# Patient Record
Sex: FEMALE | Race: WHITE | NOT HISPANIC OR LATINO | Employment: UNEMPLOYED | URBAN - METROPOLITAN AREA
[De-identification: names, ages, dates, MRNs, and addresses within clinical notes are randomized per-mention and may not be internally consistent; named-entity substitution may affect disease eponyms.]

---

## 2017-05-02 ENCOUNTER — APPOINTMENT (OUTPATIENT)
Dept: MAMMOGRAPHY | Facility: HOSPITAL | Age: 52
End: 2017-05-02

## 2017-06-05 ENCOUNTER — TRANSCRIBE ORDERS (OUTPATIENT)
Dept: ADMINISTRATIVE | Facility: HOSPITAL | Age: 52
End: 2017-06-05

## 2017-06-05 DIAGNOSIS — R92.8 ABNORMAL MAMMOGRAM: Primary | ICD-10-CM

## 2017-07-06 ENCOUNTER — HOSPITAL ENCOUNTER (OUTPATIENT)
Dept: MAMMOGRAPHY | Facility: HOSPITAL | Age: 52
Discharge: HOME | End: 2017-07-06
Admitting: NURSE PRACTITIONER

## 2017-07-06 ENCOUNTER — HOSPITAL ENCOUNTER (OUTPATIENT)
Dept: ULTRASOUND IMAGING | Facility: HOSPITAL | Age: 52
Discharge: HOME | End: 2017-07-06

## 2017-07-06 DIAGNOSIS — R92.8 ABNORMAL MAMMOGRAM: ICD-10-CM

## 2017-07-06 PROCEDURE — G0204 DX MAMMO INCL CAD BI: HCPCS

## 2017-07-06 PROCEDURE — G0279 TOMOSYNTHESIS, MAMMO: HCPCS

## 2017-07-06 PROCEDURE — 77066 DX MAMMO INCL CAD BI: CPT | Performed by: RADIOLOGY

## 2017-07-06 PROCEDURE — 76642 ULTRASOUND BREAST LIMITED: CPT

## 2017-07-06 PROCEDURE — 77062 BREAST TOMOSYNTHESIS BI: CPT | Performed by: RADIOLOGY

## 2017-07-06 PROCEDURE — 76642 ULTRASOUND BREAST LIMITED: CPT | Performed by: RADIOLOGY

## 2018-03-22 ENCOUNTER — TRANSCRIBE ORDERS (OUTPATIENT)
Dept: ADMINISTRATIVE | Facility: HOSPITAL | Age: 53
End: 2018-03-22

## 2018-03-22 DIAGNOSIS — R05.9 COUGH: Primary | ICD-10-CM

## 2018-03-30 ENCOUNTER — HOSPITAL ENCOUNTER (OUTPATIENT)
Dept: CT IMAGING | Facility: HOSPITAL | Age: 53
Discharge: HOME | End: 2018-03-30
Admitting: NURSE PRACTITIONER

## 2018-03-30 DIAGNOSIS — R05.9 COUGH: ICD-10-CM

## 2018-03-30 PROCEDURE — 71260 CT THORAX DX C+: CPT

## 2018-03-30 PROCEDURE — 82565 ASSAY OF CREATININE: CPT

## 2018-03-30 PROCEDURE — 25010000002 IOPAMIDOL 61 % SOLUTION: Performed by: NURSE PRACTITIONER

## 2018-03-30 RX ADMIN — IOPAMIDOL 50 ML: 612 INJECTION, SOLUTION INTRAVENOUS at 13:50

## 2018-04-02 LAB — CREAT BLDA-MCNC: 1 MG/DL (ref 0.6–1.3)

## 2018-04-05 ENCOUNTER — TRANSCRIBE ORDERS (OUTPATIENT)
Dept: ADMINISTRATIVE | Facility: HOSPITAL | Age: 53
End: 2018-04-05

## 2018-04-05 DIAGNOSIS — E04.1 THYROID NODULE: Primary | ICD-10-CM

## 2018-04-12 ENCOUNTER — HOSPITAL ENCOUNTER (OUTPATIENT)
Dept: ULTRASOUND IMAGING | Facility: HOSPITAL | Age: 53
Discharge: HOME | End: 2018-04-12
Admitting: NURSE PRACTITIONER

## 2018-04-12 DIAGNOSIS — E04.1 THYROID NODULE: ICD-10-CM

## 2018-04-12 PROCEDURE — 76536 US EXAM OF HEAD AND NECK: CPT

## 2018-04-19 ENCOUNTER — TRANSCRIBE ORDERS (OUTPATIENT)
Dept: ADMINISTRATIVE | Facility: HOSPITAL | Age: 53
End: 2018-04-19

## 2018-04-19 DIAGNOSIS — E04.1 THYROID NODULE: Primary | ICD-10-CM

## 2018-04-26 ENCOUNTER — HOSPITAL ENCOUNTER (OUTPATIENT)
Dept: ULTRASOUND IMAGING | Facility: HOSPITAL | Age: 53
Discharge: HOME | End: 2018-04-26
Attending: OTOLARYNGOLOGY | Admitting: OTOLARYNGOLOGY

## 2018-04-26 DIAGNOSIS — E04.1 THYROID NODULE: ICD-10-CM

## 2018-04-26 PROCEDURE — 88173 CYTOPATH EVAL FNA REPORT: CPT | Performed by: RADIOLOGY

## 2018-04-26 PROCEDURE — 76942 ECHO GUIDE FOR BIOPSY: CPT

## 2018-04-26 PROCEDURE — 88305 TISSUE EXAM BY PATHOLOGIST: CPT | Performed by: RADIOLOGY

## 2018-04-26 RX ORDER — LIDOCAINE HYDROCHLORIDE 10 MG/ML
20 INJECTION, SOLUTION EPIDURAL; INFILTRATION; INTRACAUDAL; PERINEURAL ONCE
Status: COMPLETED | OUTPATIENT
Start: 2018-04-26 | End: 2018-04-26

## 2018-04-26 RX ADMIN — LIDOCAINE HYDROCHLORIDE 8 ML: 10 INJECTION, SOLUTION EPIDURAL; INFILTRATION; INTRACAUDAL; PERINEURAL at 14:22

## 2018-04-30 LAB
LAB AP CASE REPORT: NORMAL
Lab: NORMAL
PATH REPORT.FINAL DX SPEC: NORMAL

## 2018-06-15 ENCOUNTER — TRANSCRIBE ORDERS (OUTPATIENT)
Dept: ADMINISTRATIVE | Facility: HOSPITAL | Age: 53
End: 2018-06-15

## 2018-06-15 DIAGNOSIS — R92.8 ABNORMAL MAMMOGRAM: Primary | ICD-10-CM

## 2018-10-15 ENCOUNTER — TRANSCRIBE ORDERS (OUTPATIENT)
Dept: ADMINISTRATIVE | Facility: HOSPITAL | Age: 53
End: 2018-10-15

## 2018-10-15 DIAGNOSIS — E04.1 THYROID NODULE: Primary | ICD-10-CM

## 2018-11-13 ENCOUNTER — APPOINTMENT (OUTPATIENT)
Dept: ULTRASOUND IMAGING | Facility: HOSPITAL | Age: 53
End: 2018-11-13
Attending: OTOLARYNGOLOGY

## 2018-11-20 ENCOUNTER — HOSPITAL ENCOUNTER (OUTPATIENT)
Dept: ULTRASOUND IMAGING | Facility: HOSPITAL | Age: 53
Discharge: HOME | End: 2018-11-20
Attending: OTOLARYNGOLOGY | Admitting: OTOLARYNGOLOGY

## 2018-11-20 DIAGNOSIS — E04.1 THYROID NODULE: ICD-10-CM

## 2018-11-20 PROCEDURE — 76536 US EXAM OF HEAD AND NECK: CPT

## 2019-05-10 ENCOUNTER — TRANSCRIBE ORDERS (OUTPATIENT)
Dept: ADMINISTRATIVE | Facility: HOSPITAL | Age: 54
End: 2019-05-10

## 2019-05-10 DIAGNOSIS — E04.1 THYROID NODULE: Primary | ICD-10-CM

## 2019-05-23 ENCOUNTER — HOSPITAL ENCOUNTER (OUTPATIENT)
Dept: ULTRASOUND IMAGING | Facility: HOSPITAL | Age: 54
Discharge: HOME | End: 2019-05-23
Admitting: OTOLARYNGOLOGY

## 2019-05-23 DIAGNOSIS — E04.1 THYROID NODULE: ICD-10-CM

## 2019-05-23 PROCEDURE — 76536 US EXAM OF HEAD AND NECK: CPT

## 2020-02-20 ENCOUNTER — TRANSCRIBE ORDERS (OUTPATIENT)
Dept: ADMINISTRATIVE | Facility: HOSPITAL | Age: 55
End: 2020-02-20

## 2020-02-20 DIAGNOSIS — C73 PAPILLARY CARCINOMA OF THYROID (HCC): Primary | ICD-10-CM

## 2020-03-19 ENCOUNTER — TRANSCRIBE ORDERS (OUTPATIENT)
Dept: ADMINISTRATIVE | Facility: HOSPITAL | Age: 55
End: 2020-03-19

## 2020-03-19 DIAGNOSIS — R91.1 LUNG NODULE: Primary | ICD-10-CM

## 2020-05-05 ENCOUNTER — HOSPITAL ENCOUNTER (OUTPATIENT)
Dept: ULTRASOUND IMAGING | Facility: HOSPITAL | Age: 55
Discharge: HOME | End: 2020-05-05
Admitting: OTOLARYNGOLOGY

## 2020-05-05 ENCOUNTER — HOSPITAL ENCOUNTER (OUTPATIENT)
Dept: CT IMAGING | Facility: HOSPITAL | Age: 55
Discharge: HOME | End: 2020-05-05

## 2020-05-05 DIAGNOSIS — C73 PAPILLARY CARCINOMA OF THYROID (HCC): ICD-10-CM

## 2020-05-05 DIAGNOSIS — R91.1 LUNG NODULE: ICD-10-CM

## 2020-05-05 LAB — CREAT BLDA-MCNC: 0.8 MG/DL (ref 0.6–1.3)

## 2020-05-05 PROCEDURE — 71260 CT THORAX DX C+: CPT

## 2020-05-05 PROCEDURE — 76536 US EXAM OF HEAD AND NECK: CPT

## 2020-05-05 PROCEDURE — 25010000002 IOPAMIDOL 61 % SOLUTION: Performed by: OTOLARYNGOLOGY

## 2020-05-05 PROCEDURE — 82565 ASSAY OF CREATININE: CPT

## 2020-05-05 RX ADMIN — IOPAMIDOL 75 ML: 612 INJECTION, SOLUTION INTRAVENOUS at 14:25

## 2021-03-26 ENCOUNTER — TRANSCRIBE ORDERS (OUTPATIENT)
Dept: ADMINISTRATIVE | Facility: HOSPITAL | Age: 56
End: 2021-03-26

## 2021-03-26 DIAGNOSIS — R91.1 LUNG NODULE: Primary | ICD-10-CM

## 2021-03-26 DIAGNOSIS — E04.1 THYROID NODULE: ICD-10-CM

## 2022-06-09 ENCOUNTER — TELEPHONE (OUTPATIENT)
Dept: FAMILY MEDICINE CLINIC | Facility: CLINIC | Age: 57
End: 2022-06-09

## 2023-04-18 ENCOUNTER — TELEPHONE (OUTPATIENT)
Dept: FAMILY MEDICINE CLINIC | Facility: CLINIC | Age: 58
End: 2023-04-18

## 2023-04-18 ENCOUNTER — OFFICE VISIT (OUTPATIENT)
Dept: FAMILY MEDICINE CLINIC | Facility: CLINIC | Age: 58
End: 2023-04-18
Payer: COMMERCIAL

## 2023-04-18 VITALS
BODY MASS INDEX: 20.25 KG/M2 | OXYGEN SATURATION: 99 % | SYSTOLIC BLOOD PRESSURE: 116 MMHG | DIASTOLIC BLOOD PRESSURE: 68 MMHG | WEIGHT: 121.56 LBS | HEART RATE: 62 BPM | HEIGHT: 65 IN

## 2023-04-18 DIAGNOSIS — Z12.4 SCREENING FOR CERVICAL CANCER: ICD-10-CM

## 2023-04-18 DIAGNOSIS — K58.2 IRRITABLE BOWEL SYNDROME WITH BOTH CONSTIPATION AND DIARRHEA: ICD-10-CM

## 2023-04-18 DIAGNOSIS — Z00.00 ANNUAL PHYSICAL EXAM: Primary | ICD-10-CM

## 2023-04-18 DIAGNOSIS — E03.9 ACQUIRED HYPOTHYROIDISM: ICD-10-CM

## 2023-04-18 DIAGNOSIS — Z12.31 ENCOUNTER FOR SCREENING MAMMOGRAM FOR MALIGNANT NEOPLASM OF BREAST: ICD-10-CM

## 2023-04-18 DIAGNOSIS — E56.9 VITAMIN DEFICIENCY: ICD-10-CM

## 2023-04-18 DIAGNOSIS — Z12.11 SCREENING FOR COLON CANCER: ICD-10-CM

## 2023-04-18 DIAGNOSIS — J45.20 MILD INTERMITTENT ASTHMA WITHOUT COMPLICATION: ICD-10-CM

## 2023-04-18 DIAGNOSIS — R22.32 ARM MASS, LEFT: ICD-10-CM

## 2023-04-18 DIAGNOSIS — Z13.220 SCREENING CHOLESTEROL LEVEL: ICD-10-CM

## 2023-04-18 DIAGNOSIS — F41.9 ANXIETY: ICD-10-CM

## 2023-04-18 PROBLEM — K58.9 IBS (IRRITABLE BOWEL SYNDROME): Status: ACTIVE | Noted: 2023-04-18

## 2023-04-18 LAB
BILIRUB BLD-MCNC: NEGATIVE MG/DL
CLARITY, POC: CLEAR
COLOR UR: YELLOW
EXPIRATION DATE: NORMAL
GLUCOSE UR STRIP-MCNC: NEGATIVE MG/DL
KETONES UR QL: NEGATIVE
LEUKOCYTE EST, POC: NEGATIVE
Lab: NORMAL
NITRITE UR-MCNC: NEGATIVE MG/ML
PH UR: 5.5 [PH] (ref 5–8)
PROT UR STRIP-MCNC: NEGATIVE MG/DL
RBC # UR STRIP: NEGATIVE /UL
SP GR UR: 1.03 (ref 1–1.03)
UROBILINOGEN UR QL: NORMAL

## 2023-04-18 RX ORDER — ALBUTEROL SULFATE 90 UG/1
AEROSOL, METERED RESPIRATORY (INHALATION) EVERY 4 HOURS
COMMUNITY
Start: 2017-12-19

## 2023-04-18 RX ORDER — CYANOCOBALAMIN 1000 UG/ML
INJECTION, SOLUTION INTRAMUSCULAR; SUBCUTANEOUS
COMMUNITY
Start: 2023-03-09

## 2023-04-18 RX ORDER — DIPHENOXYLATE HYDROCHLORIDE AND ATROPINE SULFATE 2.5; .025 MG/1; MG/1
TABLET ORAL
COMMUNITY
Start: 2023-02-09

## 2023-04-18 RX ORDER — ESTRADIOL 0.1 MG/D
1 FILM, EXTENDED RELEASE TRANSDERMAL 2 TIMES WEEKLY
COMMUNITY
Start: 2023-03-10

## 2023-04-18 RX ORDER — DUPILUMAB 300 MG/2ML
INJECTION, SOLUTION SUBCUTANEOUS
COMMUNITY

## 2023-04-18 RX ORDER — ERGOCALCIFEROL 1.25 MG/1
CAPSULE ORAL
COMMUNITY
Start: 2023-04-11

## 2023-04-18 RX ORDER — LEVOTHYROXINE SODIUM 0.05 MG/1
TABLET ORAL
COMMUNITY
Start: 2023-04-17

## 2023-04-18 RX ORDER — CLONAZEPAM 1 MG/1
1 TABLET ORAL DAILY
COMMUNITY
Start: 2023-03-13

## 2023-04-18 RX ORDER — DESVENLAFAXINE SUCCINATE 50 MG/1
50 TABLET, EXTENDED RELEASE ORAL DAILY
COMMUNITY

## 2023-04-18 NOTE — PROGRESS NOTES
"Chief Complaint  Annual Exam (Referral GI-IBS)    Subjective          Cheri Olsen presents to [facility] for preventative yearly exam.   History of Present Illness     Presents for annual exam.  Is fasting.  No smoking no alcohol use.  Past history of asthma anxiety vitamin deficiency anxiety hypothyroidism.  Doing well on medications with no issues or side effects.  Denies refills of any medications.  She is currently seeing a gastroenterologist of which she cannot remember their name but would like to get back in with Ema Dominguez as she has seen her in the past.  She sees GI for IBS with intermittent diarrhea and constipation.  States she is doing well just wants to get back in with Ema Dominguez.  She also states for a while, unable to give an exact time, she has had a soft nontender mass present to the underside of her left forearm.  She is unsure if it is increasing in size but is wondering what she can do about it.  No pain no redness.    She states she follows up regularly with her gynecologist Barbie Martin and that they keep up with her mammograms and Pap smears of which she states she is up-to-date within the past year on both of these things.  She also continues to follow-up regularly with her psychiatrist Fannie Vizcaino.    States she plans on discussing shingles vaccine further with her pharmacist.  Tetanus vaccine up-to-date.  Denies flu shots.  Encouraged her to discuss COVID vaccines further with her pharmacist also.    No dizziness no headache no chest pain no chest pressure no shortness of breath no trouble breathing no urinary or bowel issues.    Objective   Vital Signs:   /68   Pulse 62   Ht 165.1 cm (65\")   Wt 55.1 kg (121 lb 9 oz)   SpO2 99%   BMI 20.23 kg/m²     Body mass index is 20.23 kg/m².    Predictive Model Details   No score data available for Risk of Fall        PHQ-9 Depression Screening  Little interest or pleasure in doing things? 0-->not " at all   Feeling down, depressed, or hopeless? 0-->not at all   Trouble falling or staying asleep, or sleeping too much?     Feeling tired or having little energy?     Poor appetite or overeating?     Feeling bad about yourself - or that you are a failure or have let yourself or your family down?     Trouble concentrating on things, such as reading the newspaper or watching television?     Moving or speaking so slowly that other people could have noticed? Or the opposite - being so fidgety or restless that you have been moving around a lot more than usual?     Thoughts that you would be better off dead, or of hurting yourself in some way?     PHQ-9 Total Score 0   If you checked off any problems, how difficult have these problems made it for you to do your work, take care of things at home, or get along with other people?       Patient screened positive for depression based on a PHQ-9 score of  on . Follow-up recommendations include:        Immunization History   Administered Date(s) Administered   • COVID-19 (PFIZER) PURPLE CAP 01/14/2021, 02/04/2021   • Influenza, Unspecified 09/25/2017   • Tdap 09/25/2017       Review of Systems   Constitutional: Negative for chills, fatigue, fever, unexpected weight gain and unexpected weight loss.   HENT: Negative.  Negative for congestion.    Eyes: Negative for visual disturbance.   Respiratory: Negative.    Cardiovascular: Negative.    Gastrointestinal: Negative for abdominal distention, abdominal pain, constipation, diarrhea, nausea, vomiting, GERD and indigestion.   Genitourinary: Negative for decreased urine volume, dysuria, frequency, hematuria and urgency.   Musculoskeletal: Negative for back pain.   Skin: Negative for rash.        Mass under skin to left forearm.   Neurological: Negative.    Psychiatric/Behavioral: Negative.        Past History:  Medical History: has a past medical history of Allergic rhinitis (05/13/2011), Anxiety, Anxiety state (05/13/2011), Asthma,  Cancer, Depression, IBS (irritable bowel syndrome) (05/13/2011), Nasal polyps (2009), Neck pain (05/13/2011), and Recurrent sinusitis.   Surgical History: has a past surgical history that includes Hysterectomy (2000); Oophorectomy; Augmentation mammaplasty (Bilateral, 2005); Augmentation mammaplasty (Bilateral, 08/2016); Augmentation mammaplasty (Right, 04/2017); Abdominoplasty; and Sinus surgery.   Family History: family history includes Diabetes in her brother and mother; Drug abuse in her son; Drug abuse (age of onset: 36) in her brother; Hypertension in her mother; Stroke in her paternal grandmother.   Social History: reports that she has never smoked. She has never used smokeless tobacco.      Current Outpatient Medications:   •  albuterol sulfate  (90 Base) MCG/ACT inhaler, Every 4 (Four) Hours., Disp: , Rfl:   •  clonazePAM (KlonoPIN) 1 MG tablet, Take 1 tablet by mouth Daily., Disp: , Rfl:   •  cyanocobalamin 1000 MCG/ML injection, INJECT 1 ML ONCE A MONTH, Disp: , Rfl:   •  desvenlafaxine (PRISTIQ) 50 MG 24 hr tablet, Take 1 tablet by mouth Daily. Unsure of dose, Disp: , Rfl:   •  diphenoxylate-atropine (LOMOTIL) 2.5-0.025 MG per tablet, TAKE 1 TO 4 TABLETS DAILY AS NEEDED FOR DIARRHEA, Disp: , Rfl:   •  Dupilumab (Dupixent) 300 MG/2ML solution prefilled syringe, Dupixent 300 mg/2 mL subcutaneous syringe, Disp: , Rfl:   •  estradiol (VIVELLE-DOT) 0.1 MG/24HR patch, 1 patch 2 (Two) Times a Week., Disp: , Rfl:   •  levothyroxine (SYNTHROID, LEVOTHROID) 50 MCG tablet, , Disp: , Rfl:   •  vitamin D (ERGOCALCIFEROL) 1.25 MG (79806 UT) capsule capsule, , Disp: , Rfl:    Allergies: Avelox [moxifloxacin], Dexamethasone, and Prednisone    Physical Exam  Constitutional:       Appearance: Normal appearance. She is normal weight.   HENT:      Head: Normocephalic.      Right Ear: Tympanic membrane, ear canal and external ear normal.      Left Ear: Tympanic membrane, ear canal and external ear normal.      Nose:  Nose normal.      Mouth/Throat:      Mouth: Mucous membranes are moist.      Pharynx: Oropharynx is clear.   Eyes:      Extraocular Movements: Extraocular movements intact.      Conjunctiva/sclera: Conjunctivae normal.      Pupils: Pupils are equal, round, and reactive to light.   Cardiovascular:      Rate and Rhythm: Normal rate and regular rhythm.      Heart sounds: Normal heart sounds.   Pulmonary:      Effort: Pulmonary effort is normal.      Breath sounds: Normal breath sounds.   Abdominal:      General: Abdomen is flat. Bowel sounds are normal. There is no distension.      Palpations: Abdomen is soft.      Tenderness: There is no abdominal tenderness. There is no guarding or rebound.   Musculoskeletal:         General: Normal range of motion.      Cervical back: Normal range of motion.   Skin:     General: Skin is warm.      Findings: No erythema or rash.          Neurological:      General: No focal deficit present.      Mental Status: She is alert and oriented to person, place, and time. Mental status is at baseline.   Psychiatric:         Mood and Affect: Mood normal.         Behavior: Behavior normal.         Thought Content: Thought content normal.         Judgment: Judgment normal.          Result Review :                     Assessment and Plan    Diagnoses and all orders for this visit:    1. Annual physical exam (Primary)  Assessment & Plan:  Fasting labs drawn.  UA completed.  Proper diet and exercise plan discussed and encouraged.  Goal blood pressure less than 140/90, let me know if gets to or above that.  PHQ-9 completed and discussed.  No thoughts of suicide or hurting herself or anyone else.  Continue to follow-up with gynecologist Barbie Martin, psychiatrist Fannie Vizcaino and GI as scheduled.  Colonoscopy up-to-date.  She states she is up-to-date on mammograms and Pap smears through her gynecologist.  She denies flu shots.  Tetanus vaccine up-to-date.  Encouraged her to discuss shingles and  COVID vaccines further with her pharmacist.  She will let me know when and if she wants me to schedule her with general surgery related to the mass on her left arm, I recommended general surgery referral for further evaluation.  Annual dental and eye exams encouraged.  Return to clinic or ED with any issues or concerns.    Orders:  -     CBC & Differential; Future  -     Comprehensive Metabolic Panel; Future  -     TSH Rfx On Abnormal To Free T4; Future  -     POC Urinalysis Dipstick, Automated; Future  -     CBC & Differential  -     Comprehensive Metabolic Panel  -     TSH Rfx On Abnormal To Free T4    2. Screening cholesterol level  -     Lipid Panel; Future  -     Lipid Panel    3. Vitamin deficiency  -     Vitamin D,25-Hydroxy; Future  -     Vitamin B12; Future  -     Vitamin D,25-Hydroxy  -     Vitamin B12    4. Screening for colon cancer    5. Encounter for screening mammogram for malignant neoplasm of breast    6. Screening for cervical cancer  Assessment & Plan:  Patient states she will continue to follow-up with her gynecologist Barbie Martin who keeps up with her Pap smears and mammograms.  She states she is up-to-date on both of these things.      7. Acquired hypothyroidism    8. Anxiety  Assessment & Plan:  Continue current medications.  Continue to follow-up with psychiatrist Fannie Vizcaino as scheduled.  PHQ-9 completed and discussed.  No thoughts of suicide hurting yourself or anyone else.  Return to clinic or ED with any issues or concerns.      9. Mild intermittent asthma without complication    10. Irritable bowel syndrome with both constipation and diarrhea  Assessment & Plan:  Will refer back to GI Ema Dominguez.  Proper diet and exercise plan discussed and encouraged.  Return to clinic or ED with any issues or concerns.    Orders:  -     Ambulatory Referral to Gastroenterology    11. Arm mass, left  Assessment & Plan:  Possibly a lipoma.  Recommended referral to general surgery for further  evaluation but she denies.  She states she wants to hold off for the time being and will let me know when she wants me to schedule that appointment for her.  She understands the risks of waiting.              BMI is within normal parameters. No other follow-up for BMI required.       Follow Up   Return in about 6 months (around 10/18/2023), or if symptoms worsen or fail to improve.  Patient was given instructions and counseling regarding her condition or for health maintenance advice. Please see specific information pulled into the AVS if appropriate.     DONAL Street

## 2023-04-18 NOTE — ASSESSMENT & PLAN NOTE
Continue current medications.  Continue to follow-up with psychiatrist Fannie Vizcaino as scheduled.  PHQ-9 completed and discussed.  No thoughts of suicide hurting yourself or anyone else.  Return to clinic or ED with any issues or concerns.

## 2023-04-18 NOTE — ASSESSMENT & PLAN NOTE
Fasting labs drawn.  UA completed.  Proper diet and exercise plan discussed and encouraged.  Goal blood pressure less than 140/90, let me know if gets to or above that.  PHQ-9 completed and discussed.  No thoughts of suicide or hurting herself or anyone else.  Continue to follow-up with gynecologist Barbie Martin, psychiatrist Fannie Vizcaino and GI as scheduled.  Colonoscopy up-to-date.  She states she is up-to-date on mammograms and Pap smears through her gynecologist.  She denies flu shots.  Tetanus vaccine up-to-date.  Encouraged her to discuss shingles and COVID vaccines further with her pharmacist.  She will let me know when and if she wants me to schedule her with general surgery related to the mass on her left arm, I recommended general surgery referral for further evaluation.  Annual dental and eye exams encouraged.  Return to clinic or ED with any issues or concerns.

## 2023-04-18 NOTE — ASSESSMENT & PLAN NOTE
Will refer back to GRECIA Dominguez.  Proper diet and exercise plan discussed and encouraged.  Return to clinic or ED with any issues or concerns.

## 2023-04-18 NOTE — ASSESSMENT & PLAN NOTE
Patient states she will continue to follow-up with her gynecologist Barbie Martin who keeps up with her Pap smears and mammograms.  She states she is up-to-date on both of these things.

## 2023-04-18 NOTE — TELEPHONE ENCOUNTER
Can someone get patients past mammograms. Says her gynecolgist Barbie Martin does them. Make sure within the past year. Thanks

## 2023-04-18 NOTE — ASSESSMENT & PLAN NOTE
Possibly a lipoma.  Recommended referral to general surgery for further evaluation but she denies.  She states she wants to hold off for the time being and will let me know when she wants me to schedule that appointment for her.  She understands the risks of waiting.

## 2023-04-19 LAB
25(OH)D3+25(OH)D2 SERPL-MCNC: 37.3 NG/ML (ref 30–100)
ALBUMIN SERPL-MCNC: 4.6 G/DL (ref 3.8–4.9)
ALBUMIN/GLOB SERPL: 2.7 {RATIO} (ref 1.2–2.2)
ALP SERPL-CCNC: 44 IU/L (ref 44–121)
ALT SERPL-CCNC: 7 IU/L (ref 0–32)
AST SERPL-CCNC: 14 IU/L (ref 0–40)
BASOPHILS # BLD AUTO: 0.1 X10E3/UL (ref 0–0.2)
BASOPHILS NFR BLD AUTO: 1 %
BILIRUB SERPL-MCNC: 0.5 MG/DL (ref 0–1.2)
BUN SERPL-MCNC: 18 MG/DL (ref 6–24)
BUN/CREAT SERPL: 20 (ref 9–23)
CALCIUM SERPL-MCNC: 8.6 MG/DL (ref 8.7–10.2)
CHLORIDE SERPL-SCNC: 103 MMOL/L (ref 96–106)
CHOLEST SERPL-MCNC: 193 MG/DL (ref 100–199)
CO2 SERPL-SCNC: 27 MMOL/L (ref 20–29)
CREAT SERPL-MCNC: 0.91 MG/DL (ref 0.57–1)
EGFRCR SERPLBLD CKD-EPI 2021: 75 ML/MIN/1.73
EOSINOPHIL # BLD AUTO: 0.4 X10E3/UL (ref 0–0.4)
EOSINOPHIL NFR BLD AUTO: 8 %
ERYTHROCYTE [DISTWIDTH] IN BLOOD BY AUTOMATED COUNT: 12.1 % (ref 11.7–15.4)
GLOBULIN SER CALC-MCNC: 1.7 G/DL (ref 1.5–4.5)
GLUCOSE SERPL-MCNC: 84 MG/DL (ref 70–99)
HCT VFR BLD AUTO: 36 % (ref 34–46.6)
HDLC SERPL-MCNC: 90 MG/DL
HGB BLD-MCNC: 12.3 G/DL (ref 11.1–15.9)
IMM GRANULOCYTES # BLD AUTO: 0 X10E3/UL (ref 0–0.1)
IMM GRANULOCYTES NFR BLD AUTO: 0 %
LDLC SERPL CALC-MCNC: 93 MG/DL (ref 0–99)
LYMPHOCYTES # BLD AUTO: 1.5 X10E3/UL (ref 0.7–3.1)
LYMPHOCYTES NFR BLD AUTO: 34 %
MCH RBC QN AUTO: 30.8 PG (ref 26.6–33)
MCHC RBC AUTO-ENTMCNC: 34.2 G/DL (ref 31.5–35.7)
MCV RBC AUTO: 90 FL (ref 79–97)
MONOCYTES # BLD AUTO: 0.3 X10E3/UL (ref 0.1–0.9)
MONOCYTES NFR BLD AUTO: 6 %
NEUTROPHILS # BLD AUTO: 2.1 X10E3/UL (ref 1.4–7)
NEUTROPHILS NFR BLD AUTO: 51 %
PLATELET # BLD AUTO: 194 X10E3/UL (ref 150–450)
POTASSIUM SERPL-SCNC: 4.1 MMOL/L (ref 3.5–5.2)
PROT SERPL-MCNC: 6.3 G/DL (ref 6–8.5)
RBC # BLD AUTO: 3.99 X10E6/UL (ref 3.77–5.28)
SODIUM SERPL-SCNC: 141 MMOL/L (ref 134–144)
TRIGL SERPL-MCNC: 51 MG/DL (ref 0–149)
TSH SERPL DL<=0.005 MIU/L-ACNC: 0.97 UIU/ML (ref 0.45–4.5)
VIT B12 SERPL-MCNC: 422 PG/ML (ref 232–1245)
VLDLC SERPL CALC-MCNC: 10 MG/DL (ref 5–40)
WBC # BLD AUTO: 4.3 X10E3/UL (ref 3.4–10.8)

## 2023-04-20 RX ORDER — DIPHENOXYLATE HYDROCHLORIDE AND ATROPINE SULFATE 2.5; .025 MG/1; MG/1
TABLET ORAL
Status: CANCELLED | OUTPATIENT
Start: 2023-04-20

## 2023-04-20 NOTE — TELEPHONE ENCOUNTER
Patient states that her current GI will not write the script without her doing a colonoscopy, she had a colonoscopy in 2018 and doesn't want to have another.  She needs a referral to [facility] office in [place], she states that Ema is no longer in [place].  She states that she just needs one month supply until she can be seen. She is unable to go out into public without her medicine.

## 2023-04-20 NOTE — TELEPHONE ENCOUNTER
Please let pt. Know that I did not realize the lomotil was a controlled substance so it would be best if she requested refill from her current gastroenterologist until she can get in with Ema Manzanares. Thanks

## 2023-04-21 ENCOUNTER — TELEPHONE (OUTPATIENT)
Dept: FAMILY MEDICINE CLINIC | Facility: CLINIC | Age: 58
End: 2023-04-21
Payer: COMMERCIAL

## 2023-04-24 NOTE — TELEPHONE ENCOUNTER
Will need UDS before can send. Evelina called her psychiatrist office and they stated they could not send results of her past uds.

## 2024-06-07 ENCOUNTER — TELEPHONE (OUTPATIENT)
Dept: FAMILY MEDICINE CLINIC | Facility: CLINIC | Age: 59
End: 2024-06-07

## 2024-06-07 ENCOUNTER — OFFICE VISIT (OUTPATIENT)
Dept: FAMILY MEDICINE CLINIC | Facility: CLINIC | Age: 59
End: 2024-06-07
Payer: COMMERCIAL

## 2024-06-07 VITALS
HEIGHT: 65 IN | SYSTOLIC BLOOD PRESSURE: 124 MMHG | BODY MASS INDEX: 20.25 KG/M2 | HEART RATE: 75 BPM | OXYGEN SATURATION: 97 % | WEIGHT: 121.56 LBS | DIASTOLIC BLOOD PRESSURE: 70 MMHG

## 2024-06-07 DIAGNOSIS — Z12.31 ENCOUNTER FOR SCREENING MAMMOGRAM FOR MALIGNANT NEOPLASM OF BREAST: ICD-10-CM

## 2024-06-07 DIAGNOSIS — Z12.11 SCREENING FOR COLON CANCER: ICD-10-CM

## 2024-06-07 DIAGNOSIS — Z13.220 SCREENING CHOLESTEROL LEVEL: ICD-10-CM

## 2024-06-07 DIAGNOSIS — R41.840 DIFFICULTY CONCENTRATING: ICD-10-CM

## 2024-06-07 DIAGNOSIS — Z00.00 ANNUAL PHYSICAL EXAM: Primary | ICD-10-CM

## 2024-06-07 DIAGNOSIS — F41.9 ANXIETY: ICD-10-CM

## 2024-06-07 DIAGNOSIS — Z12.4 SCREENING FOR CERVICAL CANCER: ICD-10-CM

## 2024-06-07 DIAGNOSIS — E56.9 VITAMIN DEFICIENCY: ICD-10-CM

## 2024-06-07 DIAGNOSIS — E04.1 THYROID NODULE: ICD-10-CM

## 2024-06-07 DIAGNOSIS — E03.9 ACQUIRED HYPOTHYROIDISM: ICD-10-CM

## 2024-06-07 DIAGNOSIS — J45.20 MILD INTERMITTENT ASTHMA WITHOUT COMPLICATION: ICD-10-CM

## 2024-06-07 DIAGNOSIS — K58.2 IRRITABLE BOWEL SYNDROME WITH BOTH CONSTIPATION AND DIARRHEA: ICD-10-CM

## 2024-06-07 DIAGNOSIS — Z11.59 NEED FOR HEPATITIS C SCREENING TEST: ICD-10-CM

## 2024-06-07 LAB
BILIRUB BLD-MCNC: NEGATIVE MG/DL
CLARITY, POC: CLEAR
COLOR UR: YELLOW
EXPIRATION DATE: NORMAL
GLUCOSE UR STRIP-MCNC: NEGATIVE MG/DL
KETONES UR QL: NEGATIVE
LEUKOCYTE EST, POC: NEGATIVE
Lab: NORMAL
NITRITE UR-MCNC: NEGATIVE MG/ML
PH UR: 5.5 [PH] (ref 5–8)
PROT UR STRIP-MCNC: NEGATIVE MG/DL
RBC # UR STRIP: NEGATIVE /UL
SP GR UR: 1.02 (ref 1–1.03)
UROBILINOGEN UR QL: NORMAL

## 2024-06-07 PROCEDURE — 99396 PREV VISIT EST AGE 40-64: CPT | Performed by: NURSE PRACTITIONER

## 2024-06-07 PROCEDURE — 81003 URINALYSIS AUTO W/O SCOPE: CPT | Performed by: NURSE PRACTITIONER

## 2024-06-07 PROCEDURE — 99213 OFFICE O/P EST LOW 20 MIN: CPT | Performed by: NURSE PRACTITIONER

## 2024-06-07 NOTE — TELEPHONE ENCOUNTER
Please get past mammogram record.  Patient states she has it with gynecology Barbie Martin in [place].  Please get records and let me see them and also get them placed into the care gaps.  Thanks

## 2024-06-07 NOTE — PROGRESS NOTES
"Chief Complaint  focusing    Subjective          Cheri Olsen presents to [facility] for preventative yearly exam.   History of Present Illness    Presents for annual exam.  No smoking no alcohol use.  Doing well on medications with no issues or side effects.  Tries to watch her diet she does stay active.  No dizziness no headache no chest pain no chest pressure no shortness of breath no trouble breathing no urinary or bowel issues.    States for a while she has had trouble focusing and concentrating.  She is concerned for ADHD.  Would like referral somewhere to be evaluated for this.  She does have a history of anxiety and she states she currently follows up with psychiatry at [facility] but states they do not evals for ADHD.    She also has history of thyroid nodule and states that she follows up yearly with her ENT specialist  who she states also monitors her asthma.    Has history of IBS and continues to follow-up with gastroenterology with Ema Manzanares.    Also has history of hypothyroidism and doing well on levothyroxine.    Colonoscopy up-to-date.  States she follows up regularly with her gynecologist Barbie Martin and states that they keep her up-to-date with her mammogram and Pap smears.  States she has had both within the past year.    Tetanus vaccine up-to-date.  Denies shingles and COVID vaccines.    Objective   Vital Signs:   /70   Pulse 75   Ht 165.1 cm (65\")   Wt 55.1 kg (121 lb 9 oz)   SpO2 97%   BMI 20.23 kg/m²     Body mass index is 20.23 kg/m².    Predictive Model Details   No score data available for Risk of Fall        PHQ-9 Depression Screening  Little interest or pleasure in doing things? 0-->not at all   Feeling down, depressed, or hopeless? 1-->several days   Trouble falling or staying asleep, or sleeping too much?     Feeling tired or having little energy?     Poor appetite or overeating?     Feeling bad about yourself - or that you " are a failure or have let yourself or your family down?     Trouble concentrating on things, such as reading the newspaper or watching television?     Moving or speaking so slowly that other people could have noticed? Or the opposite - being so fidgety or restless that you have been moving around a lot more than usual?     Thoughts that you would be better off dead, or of hurting yourself in some way?     PHQ-9 Total Score 1   If you checked off any problems, how difficult have these problems made it for you to do your work, take care of things at home, or get along with other people?       Patient screened positive for depression based on a PHQ-9 score of 1 on 6/7/2024. Follow-up recommendations include:        Immunization History   Administered Date(s) Administered    COVID-19 (PFIZER) Purple Cap Monovalent 01/14/2021, 02/04/2021    Influenza, Unspecified 09/25/2017    Tdap 09/25/2017       Review of Systems   Constitutional: Negative.    HENT: Negative.     Eyes: Negative.    Respiratory: Negative.     Cardiovascular: Negative.    Gastrointestinal: Negative.    Endocrine: Negative for polydipsia, polyphagia and polyuria.   Genitourinary: Negative.    Musculoskeletal: Negative.    Skin: Negative.    Neurological: Negative.    Psychiatric/Behavioral:  Positive for decreased concentration. Negative for agitation, behavioral problems, dysphoric mood, hallucinations, self-injury, sleep disturbance, suicidal ideas, negative for hyperactivity, depressed mood and stress. The patient is not nervous/anxious.        Past History:  Medical History: has a past medical history of Allergic rhinitis (05/13/2011), Anxiety, Anxiety state (05/13/2011), Asthma, Cancer, Depression, IBS (irritable bowel syndrome) (05/13/2011), Nasal polyps (2009), Neck pain (05/13/2011), and Recurrent sinusitis.   Surgical History: has a past surgical history that includes Hysterectomy (2000); Oophorectomy; Augmentation mammaplasty (Bilateral, 2005);  Augmentation mammaplasty (Bilateral, 08/2016); Augmentation mammaplasty (Right, 04/2017); Abdominoplasty; and Sinus surgery.   Family History: family history includes Diabetes in her brother and mother; Drug abuse in her son; Drug abuse (age of onset: 36) in her brother; Hypertension in her mother; Stroke in her paternal grandmother.   Social History: reports that she has never smoked. She has never used smokeless tobacco.      Current Outpatient Medications:     albuterol sulfate  (90 Base) MCG/ACT inhaler, Every 4 (Four) Hours., Disp: , Rfl:     clonazePAM (KlonoPIN) 1 MG tablet, Take 1 tablet by mouth Daily., Disp: , Rfl:     desvenlafaxine (PRISTIQ) 50 MG 24 hr tablet, Take 1 tablet by mouth Daily. Unsure of dose, Disp: , Rfl:     diphenoxylate-atropine (LOMOTIL) 2.5-0.025 MG per tablet, TAKE 1 TO 4 TABLETS DAILY AS NEEDED FOR DIARRHEA, Disp: , Rfl:     Dupilumab (Dupixent) 300 MG/2ML solution prefilled syringe, Dupixent 300 mg/2 mL subcutaneous syringe, Disp: , Rfl:     estradiol (VIVELLE-DOT) 0.1 MG/24HR patch, 1 patch 2 (Two) Times a Week., Disp: , Rfl:     levothyroxine (SYNTHROID, LEVOTHROID) 50 MCG tablet, , Disp: , Rfl:    Allergies: Avelox [moxifloxacin], Dexamethasone, and Prednisone    Physical Exam  Constitutional:       Appearance: Normal appearance.   HENT:      Head: Normocephalic.      Right Ear: Tympanic membrane, ear canal and external ear normal.      Left Ear: Tympanic membrane, ear canal and external ear normal.      Nose: Nose normal.      Mouth/Throat:      Mouth: Mucous membranes are moist.      Pharynx: Oropharynx is clear.   Eyes:      Extraocular Movements: Extraocular movements intact.      Conjunctiva/sclera: Conjunctivae normal.      Pupils: Pupils are equal, round, and reactive to light.   Cardiovascular:      Rate and Rhythm: Normal rate and regular rhythm.      Heart sounds: Normal heart sounds.   Pulmonary:      Effort: Pulmonary effort is normal.      Breath sounds:  Normal breath sounds.   Abdominal:      General: Abdomen is flat. Bowel sounds are normal.      Palpations: Abdomen is soft.   Genitourinary:     Comments: Denied  Musculoskeletal:         General: Normal range of motion.      Cervical back: Normal range of motion.   Skin:     General: Skin is warm.   Neurological:      General: No focal deficit present.      Mental Status: She is alert and oriented to person, place, and time. Mental status is at baseline.   Psychiatric:         Attention and Perception: Attention and perception normal.         Mood and Affect: Mood and affect normal.         Speech: Speech normal.         Behavior: Behavior normal. Behavior is cooperative.         Thought Content: Thought content normal. Thought content does not include homicidal or suicidal ideation. Thought content does not include homicidal or suicidal plan.         Cognition and Memory: Cognition and memory normal.         Judgment: Judgment normal.          Result Review :                     Assessment and Plan    Diagnoses and all orders for this visit:    1. Annual physical exam (Primary)  -     CBC & Differential  -     Comprehensive Metabolic Panel  -     TSH Rfx On Abnormal To Free T4  -     POC Urinalysis Dipstick, Automated; Future    2. Screening cholesterol level  -     Lipid Panel    3. Vitamin deficiency  -     Vitamin D,25-Hydroxy  -     Vitamin B12  -     Magnesium    4. Acquired hypothyroidism    5. Screening for colon cancer    6. Irritable bowel syndrome with both constipation and diarrhea    7. Screening for cervical cancer    8. Encounter for screening mammogram for malignant neoplasm of breast    9. Anxiety    10. Mild intermittent asthma without complication    11. Difficulty concentrating  -     Ambulatory Referral to Psychiatry    12. Need for hepatitis C screening test  -     Hepatitis C Antibody    13. Thyroid nodule      Labs drawn.  UA completed.  Goal blood pressure less than 140/90.  Let me know if  gets 2 or above that.  PHQ-9 completed and discussed.  No thoughts of suicide or hurting herself or anyone else.  Proper diet and exercise plan discussed and encouraged.  Annual dental and eye exams encouraged.    She will continue to follow-up with all of her specialist including her ENT specialist, her gynecologist, psychiatrist at [facility], gastroenterologist.    Will place referral to a new psychiatrist for further evaluation of possible ADHD.    Colonoscopy up-to-date.  States that she will continue to discuss and stay up-to-date with mammograms and Pap smears through her gynecologist Barbie Martin.    Tetanus vaccine up-to-date.  Encouraged her to discuss shingles and COVID vaccines further with her pharmacist.    Risk of meds discussed and understood.  Education provided.  Return to clinic or ED with any issues or concerns.            BMI is within normal parameters. No other follow-up for BMI required.       Follow Up   Return in about 6 months (around 12/7/2024), or if symptoms worsen or fail to improve.  Patient was given instructions and counseling regarding her condition or for health maintenance advice. Please see specific information pulled into the AVS if appropriate.     DONAL Street

## 2024-06-08 LAB
25(OH)D3+25(OH)D2 SERPL-MCNC: 27.2 NG/ML (ref 30–100)
ALBUMIN SERPL-MCNC: 4.3 G/DL (ref 3.8–4.9)
ALBUMIN/GLOB SERPL: 2 {RATIO} (ref 1.2–2.2)
ALP SERPL-CCNC: 53 IU/L (ref 44–121)
ALT SERPL-CCNC: 12 IU/L (ref 0–32)
AST SERPL-CCNC: 19 IU/L (ref 0–40)
BASOPHILS # BLD AUTO: 0.1 X10E3/UL (ref 0–0.2)
BASOPHILS NFR BLD AUTO: 1 %
BILIRUB SERPL-MCNC: 0.8 MG/DL (ref 0–1.2)
BUN SERPL-MCNC: 18 MG/DL (ref 6–24)
BUN/CREAT SERPL: 22 (ref 9–23)
CALCIUM SERPL-MCNC: 8.8 MG/DL (ref 8.7–10.2)
CHLORIDE SERPL-SCNC: 100 MMOL/L (ref 96–106)
CHOLEST SERPL-MCNC: 193 MG/DL (ref 100–199)
CO2 SERPL-SCNC: 25 MMOL/L (ref 20–29)
CREAT SERPL-MCNC: 0.83 MG/DL (ref 0.57–1)
EGFRCR SERPLBLD CKD-EPI 2021: 83 ML/MIN/1.73
EOSINOPHIL # BLD AUTO: 0.2 X10E3/UL (ref 0–0.4)
EOSINOPHIL NFR BLD AUTO: 3 %
ERYTHROCYTE [DISTWIDTH] IN BLOOD BY AUTOMATED COUNT: 12.3 % (ref 11.7–15.4)
GLOBULIN SER CALC-MCNC: 2.1 G/DL (ref 1.5–4.5)
GLUCOSE SERPL-MCNC: 99 MG/DL (ref 70–99)
HCT VFR BLD AUTO: 38 % (ref 34–46.6)
HCV IGG SERPL QL IA: NON REACTIVE
HDLC SERPL-MCNC: 97 MG/DL
HGB BLD-MCNC: 12.9 G/DL (ref 11.1–15.9)
IMM GRANULOCYTES # BLD AUTO: 0 X10E3/UL (ref 0–0.1)
IMM GRANULOCYTES NFR BLD AUTO: 0 %
LDLC SERPL CALC-MCNC: 82 MG/DL (ref 0–99)
LYMPHOCYTES # BLD AUTO: 1.7 X10E3/UL (ref 0.7–3.1)
LYMPHOCYTES NFR BLD AUTO: 34 %
MAGNESIUM SERPL-MCNC: 1.8 MG/DL (ref 1.6–2.3)
MCH RBC QN AUTO: 31.2 PG (ref 26.6–33)
MCHC RBC AUTO-ENTMCNC: 33.9 G/DL (ref 31.5–35.7)
MCV RBC AUTO: 92 FL (ref 79–97)
MONOCYTES # BLD AUTO: 0.3 X10E3/UL (ref 0.1–0.9)
MONOCYTES NFR BLD AUTO: 5 %
NEUTROPHILS # BLD AUTO: 2.8 X10E3/UL (ref 1.4–7)
NEUTROPHILS NFR BLD AUTO: 57 %
PLATELET # BLD AUTO: 204 X10E3/UL (ref 150–450)
POTASSIUM SERPL-SCNC: 4.2 MMOL/L (ref 3.5–5.2)
PROT SERPL-MCNC: 6.4 G/DL (ref 6–8.5)
RBC # BLD AUTO: 4.14 X10E6/UL (ref 3.77–5.28)
SODIUM SERPL-SCNC: 137 MMOL/L (ref 134–144)
TRIGL SERPL-MCNC: 80 MG/DL (ref 0–149)
TSH SERPL DL<=0.005 MIU/L-ACNC: 0.65 UIU/ML (ref 0.45–4.5)
VIT B12 SERPL-MCNC: 442 PG/ML (ref 232–1245)
VLDLC SERPL CALC-MCNC: 14 MG/DL (ref 5–40)
WBC # BLD AUTO: 5 X10E3/UL (ref 3.4–10.8)

## 2024-06-17 DIAGNOSIS — Z12.31 ENCOUNTER FOR SCREENING MAMMOGRAM FOR MALIGNANT NEOPLASM OF BREAST: Primary | ICD-10-CM

## 2024-06-18 ENCOUNTER — TELEPHONE (OUTPATIENT)
Dept: FAMILY MEDICINE CLINIC | Facility: CLINIC | Age: 59
End: 2024-06-18
Payer: COMMERCIAL

## 2024-06-18 NOTE — TELEPHONE ENCOUNTER
HUB TO RELAY      Chidi ordered Mammogram. They should be in contact soon to schedule her.     Left message

## 2024-07-16 NOTE — TELEPHONE ENCOUNTER
Caller: Cheri Olsen    Relationship: Self    Best call back number: [phone]    Requested Prescriptions:   Requested Prescriptions     Pending Prescriptions Disp Refills    diphenoxylate-atropine (LOMOTIL) 2.5-0.025 MG per tablet      TAKE 1 TO 4 TABLETS DAILY AS NEEDED FOR DIARRHEA      Pharmacy where request should be sent: [id number] [place] [address] AT [facility] & [address] [facility] - [phone] [facility] [phone] FX     Last office visit with prescribing clinician: 6/7/2024   Last telemedicine visit with prescribing clinician: Visit date not found   Next office visit with prescribing clinician: Visit date not found     Additional details provided by patient: PATIENT HAS 1 DAY REMAINING. HER GASTRO PROVIDER CAN NOT ASSIST AS SHE IS A PA. PATIENT WANTS TO KNOW IF A MD IN OUR OFFICE WILL SIGN OFF ON IT AS SHE NEEDS HER STOMACH MEDICATION ASAP. PLEASE ADVISE     Does the patient have less than a 3 day supply:  [x] Yes  [] No    Would you like a call back once the refill request has been completed: [x] Yes [] No    If the office needs to give you a call back, can they leave a voicemail: [x] Yes [] No    Gonzalo York Rep   07/16/24 15:56 EDT

## 2024-07-17 NOTE — TELEPHONE ENCOUNTER
She see's gastroenterology so she needs to discuss this with them as they will need to be the ones to fill.

## 2024-07-18 ENCOUNTER — TELEPHONE (OUTPATIENT)
Dept: FAMILY MEDICINE CLINIC | Facility: CLINIC | Age: 59
End: 2024-07-18

## 2024-07-18 RX ORDER — DIPHENOXYLATE HYDROCHLORIDE AND ATROPINE SULFATE 2.5; .025 MG/1; MG/1
TABLET ORAL
OUTPATIENT
Start: 2024-07-18

## 2024-07-18 NOTE — TELEPHONE ENCOUNTER
Caller: Cheri Olsen    Relationship: Self    Best call back number: [phone]     What is the best time to reach you: ANY    Who are you requesting to speak with (clinical staff, provider,  specific staff member): NURSE    Do you know the name of the person who called: PATIENT    What was the call regarding: PATIENT CALLED TO CHECK STATUS OF LOMOTIL PRESCRIPTION.  WILL MAKE APPOINTMENT IF NEEDED, OUT OF MEDICATION.      Is it okay if the provider responds through MyChart: PHONE CALL PLEASE

## 2024-07-18 NOTE — TELEPHONE ENCOUNTER
Spoke with patient. She had previously gotten her prescription from her GI but she missed an appointment and is unable to get in with them. I told her she would need to make an appointment with Chidi since he has never written it for her before.

## 2024-07-26 ENCOUNTER — OFFICE VISIT (OUTPATIENT)
Dept: FAMILY MEDICINE CLINIC | Facility: CLINIC | Age: 59
End: 2024-07-26
Payer: COMMERCIAL

## 2024-07-26 VITALS
HEART RATE: 83 BPM | DIASTOLIC BLOOD PRESSURE: 66 MMHG | HEIGHT: 65 IN | OXYGEN SATURATION: 97 % | SYSTOLIC BLOOD PRESSURE: 112 MMHG | BODY MASS INDEX: 20.57 KG/M2 | TEMPERATURE: 98 F | WEIGHT: 123.44 LBS

## 2024-07-26 DIAGNOSIS — J02.9 SORE THROAT: ICD-10-CM

## 2024-07-26 DIAGNOSIS — K58.2 IRRITABLE BOWEL SYNDROME WITH BOTH CONSTIPATION AND DIARRHEA: Primary | ICD-10-CM

## 2024-07-26 DIAGNOSIS — R82.90 NONSPECIFIC FINDING ON EXAMINATION OF URINE: ICD-10-CM

## 2024-07-26 DIAGNOSIS — R30.0 DYSURIA: ICD-10-CM

## 2024-07-26 LAB
BILIRUB BLD-MCNC: NEGATIVE MG/DL
CLARITY, POC: CLEAR
COLOR UR: YELLOW
EXPIRATION DATE: ABNORMAL
EXPIRATION DATE: NORMAL
EXPIRATION DATE: NORMAL
FLUAV AG UPPER RESP QL IA.RAPID: NOT DETECTED
FLUBV AG UPPER RESP QL IA.RAPID: NOT DETECTED
GLUCOSE UR STRIP-MCNC: NEGATIVE MG/DL
INTERNAL CONTROL: NORMAL
INTERNAL CONTROL: NORMAL
KETONES UR QL: NEGATIVE
LEUKOCYTE EST, POC: ABNORMAL
Lab: ABNORMAL
Lab: NORMAL
Lab: NORMAL
NITRITE UR-MCNC: NEGATIVE MG/ML
PH UR: 5.5 [PH] (ref 5–8)
POC AMPHETAMINES: NEGATIVE
POC BARBITURATES: NEGATIVE
POC BENZODIAZEPHINES: NEGATIVE
POC COCAINE: NEGATIVE
POC METHADONE: NEGATIVE
POC METHAMPHETAMINE SCREEN URINE: NEGATIVE
POC OPIATES: NEGATIVE
POC OXYCODONE: NEGATIVE
POC PHENCYCLIDINE: NEGATIVE
POC PROPOXYPHENE: NEGATIVE
POC THC: NEGATIVE
POC TRICYCLIC ANTIDEPRESSANTS: NEGATIVE
PROT UR STRIP-MCNC: NEGATIVE MG/DL
RBC # UR STRIP: ABNORMAL /UL
S PYO AG THROAT QL: NEGATIVE
SARS-COV-2 AG UPPER RESP QL IA.RAPID: NOT DETECTED
SP GR UR: 1.03 (ref 1–1.03)
UROBILINOGEN UR QL: NORMAL

## 2024-07-26 PROCEDURE — 87880 STREP A ASSAY W/OPTIC: CPT | Performed by: NURSE PRACTITIONER

## 2024-07-26 PROCEDURE — 87428 SARSCOV & INF VIR A&B AG IA: CPT | Performed by: NURSE PRACTITIONER

## 2024-07-26 PROCEDURE — 99214 OFFICE O/P EST MOD 30 MIN: CPT | Performed by: NURSE PRACTITIONER

## 2024-07-26 PROCEDURE — 81003 URINALYSIS AUTO W/O SCOPE: CPT | Performed by: NURSE PRACTITIONER

## 2024-07-26 RX ORDER — NITROFURANTOIN 25; 75 MG/1; MG/1
100 CAPSULE ORAL 2 TIMES DAILY
Qty: 10 CAPSULE | Refills: 0 | Status: SHIPPED | OUTPATIENT
Start: 2024-07-26

## 2024-07-26 RX ORDER — DIPHENOXYLATE HYDROCHLORIDE AND ATROPINE SULFATE 2.5; .025 MG/1; MG/1
TABLET ORAL
Qty: 60 TABLET | Refills: 1 | Status: SHIPPED | OUTPATIENT
Start: 2024-07-26

## 2024-07-26 RX ORDER — ATOMOXETINE 25 MG/1
25 CAPSULE ORAL EVERY MORNING
COMMUNITY
Start: 2024-07-02

## 2024-07-26 NOTE — TELEPHONE ENCOUNTER
Saw pt. Today. Would you care to send refill of this. LOUIE and ARMANDO appropriate. Contract signed. Thanks

## 2024-07-26 NOTE — TELEPHONE ENCOUNTER
Caller: Cheri Olsen    Relationship: Self    Best call back number: [phone]     Requested Prescriptions:   Requested Prescriptions     Pending Prescriptions Disp Refills    diphenoxylate-atropine (LOMOTIL) 2.5-0.025 MG per tablet          Pharmacy where request should be sent: [facility] [id number] [address] - [phone]  - [phone] FX     Last office visit with prescribing clinician: 7/26/2024   Last telemedicine visit with prescribing clinician: Visit date not found   Next office visit with prescribing clinician: Visit date not found     Additional details provided by patient: PATIENT WAS SEEN TODAY AND THIS WAS SUPPOSED TO BE CALLED IN. PATIENT WANTS TO MAKE SURE IT HAS REFILLS ON THE PRESCRIPTION. PLEASE ADVISE     Does the patient have less than a 3 day supply:  [x] Yes  [] No    Would you like a call back once the refill request has been completed: [x] Yes [] No    If the office needs to give you a call back, can they leave a voicemail: [x] Yes [] No    Gonzalo York Rep   07/26/24 15:59 EDT

## 2024-07-26 NOTE — ASSESSMENT & PLAN NOTE
COVID flu strep all negative today in clinic.  Throat culture sent.  Call in 2 days for results.  Viral versus bacterial discussed likely viral.  Tylenol and ibuprofen as needed for pain fever.  Salt water gargle as needed for throat pain.  Fluids and rest encouraged.  Education provided.  Return in 5 days if no improvement, sooner if worsens.  Return to clinic or ED with any issues or concerns.

## 2024-07-26 NOTE — PROGRESS NOTES
"Chief Complaint  Irritable Bowel Syndrome and Dysuria    Subjective          Cheri Olsen presents to [facility]  Irritable Bowel Syndrome  Associated symptoms include congestion, a fever and a sore throat. Pertinent negatives include no abdominal pain, chest pain, chills, coughing, fatigue, nausea, swollen glands, vomiting or weakness.   Dysuria   Pertinent negatives include no chills, frequency, hematuria, nausea, urgency or vomiting.       Patient has history of IBS.  She does see a gastroenterologist.  Requesting a refill of Lomotil as she states the provider she sees there does not do controlled medications.  States she uses it only as needed and it works well for her.    She also states for the past 3 to 4 days she has had urinary burning urgency and frequency.  Is concerned for UTI.  No blood.  No back pain.    She also states for the past 1-2 days she has had a low-grade fever (99.4) and sore throat. She does have some congestion.  No GI issues.  No chest pain no chest pressure no shortness of breath no trouble breathing.    Objective   Vital Signs:   /66   Pulse 83   Temp 98 °F (36.7 °C)   Ht 165.1 cm (65\")   Wt 56 kg (123 lb 7 oz)   SpO2 97%   BMI 20.54 kg/m²     Body mass index is 20.54 kg/m².    Review of Systems   Constitutional:  Positive for fever. Negative for chills and fatigue.   HENT:  Positive for congestion and sore throat. Negative for ear pain, sinus pressure, sneezing, swollen glands and trouble swallowing.    Respiratory:  Negative for cough, shortness of breath and wheezing.    Cardiovascular:  Negative for chest pain and palpitations.   Gastrointestinal:  Negative for abdominal pain, constipation, diarrhea, nausea and vomiting.   Genitourinary:  Positive for dysuria. Negative for decreased urine volume, frequency, hematuria and urgency.   Musculoskeletal:  Negative for back pain.   Neurological:  Negative for dizziness, weakness, " light-headedness and headache.       Past History:  Medical History: has a past medical history of Allergic rhinitis (05/13/2011), Anxiety, Anxiety state (05/13/2011), Asthma, Cancer, Depression, IBS (irritable bowel syndrome) (05/13/2011), Nasal polyps (2009), Neck pain (05/13/2011), and Recurrent sinusitis.   Surgical History: has a past surgical history that includes Hysterectomy (2000); Oophorectomy; Augmentation mammaplasty (Bilateral, 2005); Augmentation mammaplasty (Bilateral, 08/2016); Augmentation mammaplasty (Right, 04/2017); Abdominoplasty; and Sinus surgery.   Family History: family history includes Diabetes in her brother and mother; Drug abuse in her son; Drug abuse (age of onset: 36) in her brother; Hypertension in her mother; Stroke in her paternal grandmother.   Social History: reports that she has never smoked. She has never used smokeless tobacco.    PHQ-2 Depression Screening  Little interest or pleasure in doing things?     Feeling down, depressed, or hopeless?     PHQ-2 Total Score          PHQ-9 Depression Screening  Little interest or pleasure in doing things?     Feeling down, depressed, or hopeless?     Trouble falling or staying asleep, or sleeping too much?     Feeling tired or having little energy?     Poor appetite or overeating?     Feeling bad about yourself - or that you are a failure or have let yourself or your family down?     Trouble concentrating on things, such as reading the newspaper or watching television?     Moving or speaking so slowly that other people could have noticed? Or the opposite - being so fidgety or restless that you have been moving around a lot more than usual?     Thoughts that you would be better off dead, or of hurting yourself in some way?     PHQ-9 Total Score     If you checked off any problems, how difficult have these problems made it for you to do your work, take care of things at home, or get along with other people?       PHQ-9 Total Score:         Patient screened positive for depression based on a PHQ-9 score of 1 on 6/7/2024. Follow-up recommendations include: \         Current Outpatient Medications:     albuterol sulfate  (90 Base) MCG/ACT inhaler, Every 4 (Four) Hours., Disp: , Rfl:     atomoxetine (STRATTERA) 25 MG capsule, Take 1 capsule by mouth Every Morning., Disp: , Rfl:     clonazePAM (KlonoPIN) 1 MG tablet, Take 1 tablet by mouth Daily., Disp: , Rfl:     desvenlafaxine (PRISTIQ) 50 MG 24 hr tablet, Take 1 tablet by mouth Daily. Unsure of dose, Disp: , Rfl:     diphenoxylate-atropine (LOMOTIL) 2.5-0.025 MG per tablet, TAKE 1 TO 4 TABLETS DAILY AS NEEDED FOR DIARRHEA, Disp: , Rfl:     Dupilumab (Dupixent) 300 MG/2ML solution prefilled syringe, Dupixent 300 mg/2 mL subcutaneous syringe, Disp: , Rfl:     estradiol (VIVELLE-DOT) 0.1 MG/24HR patch, 1 patch 2 (Two) Times a Week., Disp: , Rfl:     levothyroxine (SYNTHROID, LEVOTHROID) 50 MCG tablet, , Disp: , Rfl:     nitrofurantoin, macrocrystal-monohydrate, (Macrobid) 100 MG capsule, Take 1 capsule by mouth 2 (Two) Times a Day., Disp: 10 capsule, Rfl: 0   (Not in a hospital admission)     Allergies: Avelox [moxifloxacin], Dexamethasone, and Prednisone    Physical Exam  Constitutional:       Appearance: Normal appearance.   HENT:      Right Ear: Tympanic membrane, ear canal and external ear normal.      Left Ear: Tympanic membrane, ear canal and external ear normal.      Nose: Nose normal.      Mouth/Throat:      Mouth: Mucous membranes are moist.      Pharynx: Oropharynx is clear.   Eyes:      Conjunctiva/sclera: Conjunctivae normal.      Pupils: Pupils are equal, round, and reactive to light.   Cardiovascular:      Rate and Rhythm: Normal rate and regular rhythm.      Heart sounds: Normal heart sounds.   Pulmonary:      Effort: Pulmonary effort is normal.      Breath sounds: Normal breath sounds.   Abdominal:      General: Abdomen is flat. Bowel sounds are normal. There is no distension.       Palpations: Abdomen is soft.      Tenderness: There is no abdominal tenderness. There is no guarding or rebound.   Skin:     General: Skin is warm.      Findings: No erythema or rash.   Neurological:      General: No focal deficit present.      Mental Status: She is alert and oriented to person, place, and time. Mental status is at baseline.   Psychiatric:         Mood and Affect: Mood normal.         Behavior: Behavior normal.         Thought Content: Thought content normal.         Judgment: Judgment normal.          Result Review :                   Assessment and Plan    Diagnoses and all orders for this visit:    1. Irritable bowel syndrome with both constipation and diarrhea (Primary)  Assessment & Plan:  Continue to follow-up with GI.  UDS completed.  Contract signed.  Judson appropriate.  Will send refill of Lomotil to take as needed.  Risk of meds discussed understood.  Education provided.  Return to clinic or ED with any issues or concerns.    Orders:  -     POC Urine Drug Screen, Triage; Future  -     POC Urine Drug Screen, Triage    2. Dysuria  Assessment & Plan:  UA completed.  Culture sent.  Call in 2 days for results. Return in 1-2 weeks for repeat UA to make sure clears. Antibiotic as prescribed.  Stay hydrated with water.  Risk of meds discussed and understood.  Education provided.  Return in 1 to 2 days if no improvement, sooner if worsens.  Return to clinic or ED with any issues or concerns.    Orders:  -     POC Urinalysis Dipstick, Automated; Future  -     nitrofurantoin, macrocrystal-monohydrate, (Macrobid) 100 MG capsule; Take 1 capsule by mouth 2 (Two) Times a Day.  Dispense: 10 capsule; Refill: 0  -     POC Urinalysis Dipstick, Automated    3. Sore throat  Assessment & Plan:  COVID flu strep all negative today in clinic.  Throat culture sent.  Call in 2 days for results.  Viral versus bacterial discussed likely viral.  Tylenol and ibuprofen as needed for pain fever.  Salt water gargle as  needed for throat pain.  Fluids and rest encouraged.  Education provided.  Return in 5 days if no improvement, sooner if worsens.  Return to clinic or ED with any issues or concerns.    Orders:  -     POC Rapid Strep A; Future  -     Covid-19 + Flu A&B AG, Veritor; Future  -     Covid-19 + Flu A&B AG, Veritor  -     POC Rapid Strep A  -     Throat / Upper Respiratory Culture - Swab, Throat    4. Nonspecific finding on examination of urine  -     Urine Culture - Urine, Urine, Clean Catch              BMI is within normal parameters. No other follow-up for BMI required.       Follow Up   Return if symptoms worsen or fail to improve.  Patient was given instructions and counseling regarding her condition or for health maintenance advice. Please see specific information pulled into the AVS if appropriate.     DONAL Street

## 2024-07-26 NOTE — ASSESSMENT & PLAN NOTE
Continue to follow-up with GI.  UDS completed.  Contract signed.  Judson appropriate.  Will send refill of Lomotil to take as needed.  Risk of meds discussed understood.  Education provided.  Return to clinic or ED with any issues or concerns.

## 2024-07-26 NOTE — ASSESSMENT & PLAN NOTE
UA completed.  Culture sent.  Call in 2 days for results. Return in 1-2 weeks for repeat UA to make sure clears. Antibiotic as prescribed.  Stay hydrated with water.  Risk of meds discussed and understood.  Education provided.  Return in 1 to 2 days if no improvement, sooner if worsens.  Return to clinic or ED with any issues or concerns.

## 2024-07-29 ENCOUNTER — TELEPHONE (OUTPATIENT)
Dept: FAMILY MEDICINE CLINIC | Facility: CLINIC | Age: 59
End: 2024-07-29

## 2024-07-29 NOTE — TELEPHONE ENCOUNTER
Spoke with patient. She wanted to speak with Chidi regarding a family member. She asked that I pass along a message to Chidi privately and I did.

## 2024-07-29 NOTE — TELEPHONE ENCOUNTER
Caller: Cheri Olsen    Relationship: Self    Best call back number: [phone]     What is the best time to reach you: ANY    Who are you requesting to speak with (clinical staff, provider,  specific staff member): AUDELIA ROJAS    Do you know the name of the person who called: SELF    What was the call regarding: PATIENT HAD A MEDICAL ISSUE SHE WISHED TO DISCUSS WITH HER PRIMARY CARE PROVIDER. SHE PREFERRED TO ONLY DISCLOSE THE NATURE OF THAT ISSUE WITH HER PROVIDER.    Is it okay if the provider responds through MyChart: NO

## 2024-07-30 LAB
BACTERIA SPEC RESP CULT: NORMAL
BACTERIA SPEC RESP CULT: NORMAL
BACTERIA UR CULT: ABNORMAL
BACTERIA UR CULT: ABNORMAL
OTHER ANTIBIOTIC SUSC ISLT: ABNORMAL

## 2024-07-31 RX ORDER — SULFAMETHOXAZOLE AND TRIMETHOPRIM 800; 160 MG/1; MG/1
1 TABLET ORAL 2 TIMES DAILY
Qty: 14 TABLET | Refills: 0 | Status: SHIPPED | OUTPATIENT
Start: 2024-07-31

## 2024-10-29 DIAGNOSIS — R30.0 DYSURIA: ICD-10-CM

## 2024-10-30 RX ORDER — NITROFURANTOIN 25; 75 MG/1; MG/1
100 CAPSULE ORAL 2 TIMES DAILY
Qty: 10 CAPSULE | Refills: 0 | OUTPATIENT
Start: 2024-10-30

## 2025-06-24 ENCOUNTER — TELEPHONE (OUTPATIENT)
Dept: FAMILY MEDICINE CLINIC | Facility: CLINIC | Age: 60
End: 2025-06-24
Payer: COMMERCIAL

## 2025-06-24 DIAGNOSIS — Z12.31 ENCOUNTER FOR SCREENING MAMMOGRAM FOR MALIGNANT NEOPLASM OF BREAST: Primary | ICD-10-CM

## 2025-07-18 ENCOUNTER — OFFICE VISIT (OUTPATIENT)
Dept: FAMILY MEDICINE CLINIC | Facility: CLINIC | Age: 60
End: 2025-07-18
Payer: COMMERCIAL

## 2025-07-18 VITALS
WEIGHT: 123.25 LBS | OXYGEN SATURATION: 98 % | HEIGHT: 65 IN | DIASTOLIC BLOOD PRESSURE: 86 MMHG | SYSTOLIC BLOOD PRESSURE: 130 MMHG | HEART RATE: 63 BPM | BODY MASS INDEX: 20.54 KG/M2

## 2025-07-18 DIAGNOSIS — M79.641 BILATERAL HAND PAIN: ICD-10-CM

## 2025-07-18 DIAGNOSIS — R53.83 OTHER FATIGUE: ICD-10-CM

## 2025-07-18 DIAGNOSIS — E03.9 ACQUIRED HYPOTHYROIDISM: ICD-10-CM

## 2025-07-18 DIAGNOSIS — Z12.4 SCREENING FOR CERVICAL CANCER: ICD-10-CM

## 2025-07-18 DIAGNOSIS — M25.572 ARTHRALGIA OF BOTH ANKLES: ICD-10-CM

## 2025-07-18 DIAGNOSIS — M79.642 BILATERAL HAND PAIN: ICD-10-CM

## 2025-07-18 DIAGNOSIS — E56.9 VITAMIN DEFICIENCY: ICD-10-CM

## 2025-07-18 DIAGNOSIS — J45.20 MILD INTERMITTENT ASTHMA WITHOUT COMPLICATION: ICD-10-CM

## 2025-07-18 DIAGNOSIS — Z12.31 ENCOUNTER FOR SCREENING MAMMOGRAM FOR MALIGNANT NEOPLASM OF BREAST: ICD-10-CM

## 2025-07-18 DIAGNOSIS — Z79.899 ENCOUNTER FOR LONG-TERM (CURRENT) USE OF MEDICATIONS: ICD-10-CM

## 2025-07-18 DIAGNOSIS — E04.1 THYROID NODULE: ICD-10-CM

## 2025-07-18 DIAGNOSIS — M25.571 ARTHRALGIA OF BOTH ANKLES: ICD-10-CM

## 2025-07-18 DIAGNOSIS — K58.2 IRRITABLE BOWEL SYNDROME WITH BOTH CONSTIPATION AND DIARRHEA: ICD-10-CM

## 2025-07-18 DIAGNOSIS — Z13.220 SCREENING CHOLESTEROL LEVEL: ICD-10-CM

## 2025-07-18 DIAGNOSIS — F41.9 ANXIETY: ICD-10-CM

## 2025-07-18 DIAGNOSIS — Z00.00 ANNUAL PHYSICAL EXAM: Primary | ICD-10-CM

## 2025-07-18 DIAGNOSIS — Z12.11 SCREENING FOR COLON CANCER: ICD-10-CM

## 2025-07-18 PROBLEM — J02.9 SORE THROAT: Status: RESOLVED | Noted: 2024-07-26 | Resolved: 2025-07-18

## 2025-07-18 PROBLEM — R30.0 DYSURIA: Status: RESOLVED | Noted: 2024-07-26 | Resolved: 2025-07-18

## 2025-07-18 PROBLEM — Z11.59 NEED FOR HEPATITIS C SCREENING TEST: Status: RESOLVED | Noted: 2024-06-07 | Resolved: 2025-07-18

## 2025-07-18 LAB
BILIRUB BLD-MCNC: NEGATIVE MG/DL
CLARITY, POC: CLEAR
COLOR UR: YELLOW
EXPIRATION DATE: NORMAL
GLUCOSE UR STRIP-MCNC: NEGATIVE MG/DL
KETONES UR QL: NEGATIVE
LEUKOCYTE EST, POC: NEGATIVE
Lab: NORMAL
NITRITE UR-MCNC: NEGATIVE MG/ML
PH UR: 6 [PH] (ref 5–8)
POC AMPHETAMINES: NEGATIVE
POC BARBITURATES: NEGATIVE
POC BENZODIAZEPHINES: NEGATIVE
POC BUPRENORPHINE: NEGATIVE
POC COCAINE: NEGATIVE
POC METHADONE: NEGATIVE
POC METHAMPHETAMINE SCREEN URINE: NEGATIVE
POC OPIATES: NEGATIVE
POC OXYCODONE: NEGATIVE
POC PHENCYCLIDINE: NEGATIVE
POC PROPOXYPHENE: NEGATIVE
POC THC: NEGATIVE
POC TRICYCLIC ANTIDEPRESSANTS: NEGATIVE
PROT UR STRIP-MCNC: NEGATIVE MG/DL
RBC # UR STRIP: NEGATIVE /UL
SP GR UR: 1.01 (ref 1–1.03)
UROBILINOGEN UR QL: NORMAL

## 2025-07-18 RX ORDER — DIPHENOXYLATE HYDROCHLORIDE AND ATROPINE SULFATE 2.5; .025 MG/1; MG/1
TABLET ORAL
Qty: 60 TABLET | Refills: 0 | Status: SHIPPED | OUTPATIENT
Start: 2025-07-18

## 2025-07-18 NOTE — PROGRESS NOTES
Chief Complaint  Fatigue    Subjective          Cheri Olsen presents to [facility]  Fatigue  Symptoms: fatigue    Symptoms: no chills, no fever, no myalgias and no neck pain      History of Present Illness  The patient is a 59-year-old female who presents for an annual exam. She has a history of hypothyroidism with a thyroid nodule, IBS, anxiety, and asthma. She is doing well on her current medications. She tries to watch her diet and stays active. She does not smoke or consume alcohol. She has a mammogram scheduled for 08/05/2025. Her colonoscopy is up to date, having been completed on 10/12/2018 and is good for 10 years. She keeps up with Pap smears with her gynecologist. Her tetanus vaccine is up to date. She has not received the pneumonia, shingles, or COVID vaccines and understands the risks. She continues to follow up with all of her specialists including gynecology with Barbie Martin, ENT with Dr. Aram Feliz, gastroenterology with Dr. Matty Gordon, orthopedics with Michael Barton regarding hand pain along the bases of bilateral thumbs, and psychiatry with Fannie Fagan. She reports no dizziness, headache, chest pain, chest pressure, shortness of breath, trouble breathing, or urinary or bowel issues. She has felt fatigued, run down, and low on energy for a while. She had a Pap smear about 4 months ago with her gynecologist.    She reports feeling fatigued and low on energy, despite maintaining an active lifestyle. Her sleep is generally good, although she occasionally experiences restlessness and takes medication to aid sleep. She does not snore heavily.    She is seeking a refill of her Lomotil prescription for irritable bowel syndrome (IBS), which she has been taking for approximately 6 to 7 months. She has provided a urine sample today.  States she will be seeing her gastroenterologist next month so is asking just for a 1 month refill.    She is considering surgery  "for arthritis in her hands but is hesitant due to the potential impact on her work. She experiences tenderness and aching in her joints in hands particularly bases of thumbs, which worsens with repetitive work. She has undergone two rounds of steroid treatment, each lasting three months, but the relief was temporary. She also reports tenderness in her right ankle also, particularly after sitting for extended periods. States both ankles gets sore/tight at times but right is definitely the worse. Would like to have some blood work completed to rule out autoimmune issue such as RA.     Social History:  Diet: Tries to watch her diet  Alcohol: Does not consume alcohol  Tobacco: Does not smoke  Sleep: Generally good, occasionally restless, takes medication to aid sleep    FAMILY HISTORY  She does not report any family history of autoimmune conditions such as lupus or rheumatoid arthritis.    Patient Care Team:  Chidi Ashby APRN as PCP - General (Nurse Practitioner)  Barbie Martin APRN as Nurse Practitioner (Obstetrics and Gynecology)  Cruzito Gordon MD as Consulting Physician (Gastroenterology)  Michael Minor PA as Physician Assistant (Physician Assistant)  Fannie Dupree APRN (Nurse Practitioner)  Aram Braswell MD as Consulting Physician (Otolaryngology)  Gavino Hurst MD as Consulting Physician (Otolaryngology)     Objective   Vital Signs:   /86   Pulse 63   Ht 165.1 cm (65\")   Wt 55.9 kg (123 lb 4 oz)   SpO2 98%   BMI 20.51 kg/m²     Body mass index is 20.51 kg/m².    Review of Systems   Constitutional:  Positive for fatigue. Negative for chills, fever, unexpected weight gain and unexpected weight loss.   HENT: Negative.     Eyes: Negative.    Respiratory: Negative.     Cardiovascular: Negative.    Gastrointestinal: Negative.    Endocrine: Negative for polydipsia, polyphagia and polyuria.   Genitourinary: Negative.    Musculoskeletal:  Positive for arthralgias. Negative for " joint swelling, myalgias, neck pain and neck stiffness.   Skin: Negative.    Neurological: Negative.    Psychiatric/Behavioral: Negative.         Past History:  Medical History: has a past medical history of Allergic rhinitis (05/13/2011), Anxiety, Anxiety state (05/13/2011), Asthma, Cancer, Depression, IBS (irritable bowel syndrome) (05/13/2011), Nasal polyps (2009), Neck pain (05/13/2011), and Recurrent sinusitis.   Surgical History: has a past surgical history that includes Hysterectomy (2000); Oophorectomy; Augmentation mammaplasty (Bilateral, 2005); Augmentation mammaplasty (Bilateral, 08/2016); Augmentation mammaplasty (Right, 04/2017); Abdominoplasty; and Sinus surgery.   Family History: family history includes Diabetes in her brother and mother; Drug abuse in her son; Drug abuse (age of onset: 36) in her brother; Hypertension in her mother; Stroke in her paternal grandmother.   Social History: reports that she has never smoked. She has never used smokeless tobacco.    PHQ-2 Depression Screening  Little interest or pleasure in doing things? Not at all   Feeling down, depressed, or hopeless? Not at all   PHQ-2 Total Score 0       PHQ-9 Depression Screening  Little interest or pleasure in doing things? Not at all   Feeling down, depressed, or hopeless? Not at all   PHQ-2 Total Score 0   Trouble falling or staying asleep, or sleeping too much?     Feeling tired or having little energy?     Poor appetite or overeating?     Feeling bad about yourself - or that you are a failure or have let yourself or your family down?     Trouble concentrating on things, such as reading the newspaper or watching television?     Moving or speaking so slowly that other people could have noticed? Or the opposite - being so fidgety or restless that you have been moving around a lot more than usual?     Thoughts that you would be better off dead, or of hurting yourself in some way?     PHQ-9 Total Score     If you checked off any  problems, how difficult have these problems made it for you to do your work, take care of things at home, or get along with other people? Not difficult at all        PHQ-9 Total Score:        Patient screened positive for depression based on a PHQ-9 score of  on . Follow-up recommendations include:          Current Outpatient Medications:     albuterol sulfate  (90 Base) MCG/ACT inhaler, Every 4 (Four) Hours., Disp: , Rfl:     clonazePAM (KlonoPIN) 1 MG tablet, Take 1 tablet by mouth Daily., Disp: , Rfl:     desvenlafaxine (PRISTIQ) 50 MG 24 hr tablet, Take 1 tablet by mouth Daily. Unsure of dose, Disp: , Rfl:     diphenoxylate-atropine (LOMOTIL) 2.5-0.025 MG per tablet, TAKE 1 TO 4 TABLETS DAILY AS NEEDED PO FOR DIARRHEA, Disp: 60 tablet, Rfl: 0    Dupilumab (Dupixent) 300 MG/2ML solution prefilled syringe, Dupixent 300 mg/2 mL subcutaneous syringe, Disp: , Rfl:     estradiol (VIVELLE-DOT) 0.1 MG/24HR patch, 1 patch 2 (Two) Times a Week., Disp: , Rfl:     levothyroxine (SYNTHROID, LEVOTHROID) 50 MCG tablet, , Disp: , Rfl:    (Not in a hospital admission)     Allergies: Avelox [moxifloxacin], Dexamethasone, and Prednisone    Physical Exam  Constitutional:       Appearance: Normal appearance.   HENT:      Head: Normocephalic.      Right Ear: Tympanic membrane, ear canal and external ear normal.      Left Ear: Tympanic membrane, ear canal and external ear normal.      Nose: Nose normal.      Mouth/Throat:      Mouth: Mucous membranes are moist.      Pharynx: Oropharynx is clear.   Eyes:      Extraocular Movements: Extraocular movements intact.      Conjunctiva/sclera: Conjunctivae normal.      Pupils: Pupils are equal, round, and reactive to light.   Cardiovascular:      Rate and Rhythm: Normal rate and regular rhythm.      Heart sounds: Normal heart sounds.   Pulmonary:      Effort: Pulmonary effort is normal.      Breath sounds: Normal breath sounds.   Abdominal:      General: Abdomen is flat. Bowel sounds  are normal.      Palpations: Abdomen is soft.   Genitourinary:     Comments: Denied   Musculoskeletal:         General: Normal range of motion.      Right hand: Normal.      Left hand: Normal.      Cervical back: Normal range of motion.      Right lower leg: No edema.      Left lower leg: No edema.      Right ankle: Normal.      Left ankle: Normal.   Skin:     General: Skin is warm.      Findings: No erythema or rash.   Neurological:      General: No focal deficit present.      Mental Status: She is alert and oriented to person, place, and time. Mental status is at baseline.   Psychiatric:         Attention and Perception: Attention and perception normal.         Mood and Affect: Mood and affect normal.         Speech: Speech normal.         Behavior: Behavior normal. Behavior is cooperative.         Thought Content: Thought content normal. Thought content does not include homicidal or suicidal ideation. Thought content does not include homicidal or suicidal plan.         Cognition and Memory: Cognition and memory normal.         Judgment: Judgment normal.        Physical Exam      Result Review :          Results               Assessment and Plan    Diagnoses and all orders for this visit:    1. Annual physical exam (Primary)  Assessment & Plan:  Labs drawn.  UA completed.  Continue current medications.  Continue to follow-up with all specialist including her gynecologist gastroenterologist orthopedist psychiatrist and ENT specialist.  Goal blood pressure less than 140/90.  Let me know if gets to or above that.  PHQ-9 completed and discussed.  No thoughts of suicide or hurting herself or anyone else.  Proper diet and exercise plan discussed and encouraged.  Annual dental and eye exams encouraged.  Colonoscopy up-to-date.  She has a mammogram already scheduled for 8/5/2025 and will follow through with that.  She states she will discuss Pap smears with her gynecologist.  Tetanus vaccine up-to-date.  Education  provided.  Risk of meds discussed and understood.  Return to clinic or ED with any issues or concerns.    Orders:  -     CBC & Differential  -     Comprehensive Metabolic Panel  -     TSH Rfx On Abnormal To Free T4  -     POC Urinalysis Dipstick, Automated; Future    2. Screening cholesterol level  -     Lipid Panel    3. Vitamin deficiency    4. Thyroid nodule  Assessment & Plan:  Continue to follow-up with ENT who is monitoring this.      5. Acquired hypothyroidism  Assessment & Plan:  Continue to follow-up with ENT.    Orders:  -     TSH Rfx On Abnormal To Free T4    6. Screening for colon cancer    7. Irritable bowel syndrome with both constipation and diarrhea  Assessment & Plan:  Continue to follow-up with gastroenterology.    Orders:  -     diphenoxylate-atropine (LOMOTIL) 2.5-0.025 MG per tablet; TAKE 1 TO 4 TABLETS DAILY AS NEEDED PO FOR DIARRHEA  Dispense: 60 tablet; Refill: 0    8. Screening for cervical cancer  Assessment & Plan:  Continue to follow-up with gynecology who she states keeps up with her Pap smears.      9. Encounter for screening mammogram for malignant neoplasm of breast    10. Anxiety  Assessment & Plan:  Continue to follow-up with psychiatry.      11. Mild intermittent asthma without complication    12. Other fatigue  -     CBC & Differential  -     Comprehensive Metabolic Panel  -     TSH Rfx On Abnormal To Free T4  -     Iron  -     Ferritin  -     Folate  -     Vitamin D,25-Hydroxy  -     Vitamin B12    13. Bilateral hand pain  Assessment & Plan:  Will place referral to another hand specialist for second opinion.    Orders:  -     Ambulatory Referral to Orthopedic Surgery    14. Arthralgia of both ankles  Assessment & Plan:  Labs drawn.  Will get x-ray of right ankle as she states it is the worst 1.  RICE encouraged.  Tylenol and ibuprofen as needed for pain.  Education provided.  If does not improve or there are any abnormal findings we will determine if we need to set her up with  orthopedist.  Return to clinic or ED with any issues or concerns.    Orders:  -     ZULY  -     C-reactive Protein  -     Sedimentation Rate  -     Rheumatoid Factor  -     XR Ankle 3+ View Right (In Office)    15. Encounter for long-term (current) use of medications  -     POC Urine Drug Screen, Triage; Future  -     POC Urine Drug Screen, Triage      Assessment & Plan  1. Fatigue.  - Reports feeling fatigued and low on energy.  - Blood work will be conducted to check for vitamin deficiencies, electrolyte imbalances, liver function, kidney function, cholesterol levels, blood sugar levels, and a fatigue panel.  - If the blood work returns normal, a home sleep study may be considered to rule out sleep apnea.  - Sleeping is generally okay, but she takes medication to help with sleep.    2. Irritable Bowel Syndrome (IBS).  - A one-month supply of Lomotil will be provided.  - A urine sample will be collected today to check for any urinary tract infections (UTIs).  - Continue to follow-up with GI.     3. Arthritis.  - Reports joint pain in her hands and ankles.  - An x-ray of her right ankle will be ordered.  - Blood work will include tests for lupus and rheumatoid arthritis to rule out autoimmune conditions.  - A referral to another hand specialist will be made for a second opinion regarding her hand pains.     4. Hypothyroidism.  - Continues to follow up with ENT specialist, Dr. Aram Feliz, for thyroid management.  - No dizziness, headache, chest pain, chest pressure, shortness of breath, or trouble breathing reported.  - No masses or abnormal range of motion detected in the physical exam.  - Thyroid management discussed with ENT specialist.    5. Anxiety.  - Doing well on current medications.  - No specific changes or concerns reported.    6. Asthma.  - Doing well on current medications.  - No specific changes or concerns reported.    7. Health Maintenance.  - Mammogram scheduled for 08/05/2025.  - Colonoscopy up  to date, last completed on 10/12/2018, and good for 10 years.  - Pap smear completed about 4 months ago with gynecologist.  - Tetanus vaccine up to date; denies pneumonia, shingles, and COVID vaccines.            BMI is within normal parameters. No other follow-up for BMI required.       Follow Up   Return in about 1 year (around 7/18/2026), or if symptoms worsen or fail to improve.  Patient was given instructions and counseling regarding her condition or for health maintenance advice. Please see specific information pulled into the AVS if appropriate.     Patient or patient representative verbalized consent for the use of Ambient Listening during the visit with  DONAL Street for chart documentation. 7/18/2025  13:44 EDT    DONAL Street

## 2025-07-18 NOTE — ASSESSMENT & PLAN NOTE
Labs drawn.  UA completed.  Continue current medications.  Continue to follow-up with all specialist including her gynecologist gastroenterologist orthopedist psychiatrist and ENT specialist.  Goal blood pressure less than 140/90.  Let me know if gets to or above that.  PHQ-9 completed and discussed.  No thoughts of suicide or hurting herself or anyone else.  Proper diet and exercise plan discussed and encouraged.  Annual dental and eye exams encouraged.  Colonoscopy up-to-date.  She has a mammogram already scheduled for 8/5/2025 and will follow through with that.  She states she will discuss Pap smears with her gynecologist.  Tetanus vaccine up-to-date.  Education provided.  Risk of meds discussed and understood.  Return to clinic or ED with any issues or concerns.

## 2025-07-18 NOTE — ASSESSMENT & PLAN NOTE
Labs drawn.  Will get x-ray of right ankle as she states it is the worst 1.  RICE encouraged.  Tylenol and ibuprofen as needed for pain.  Education provided.  If does not improve or there are any abnormal findings we will determine if we need to set her up with orthopedist.  Return to clinic or ED with any issues or concerns.

## 2025-07-21 LAB
25(OH)D3+25(OH)D2 SERPL-MCNC: 30.8 NG/ML (ref 30–100)
ALBUMIN SERPL-MCNC: 4.6 G/DL (ref 3.8–4.9)
ALP SERPL-CCNC: 51 IU/L (ref 44–121)
ALT SERPL-CCNC: 22 IU/L (ref 0–32)
ANA SER QL: NEGATIVE
AST SERPL-CCNC: 22 IU/L (ref 0–40)
BASOPHILS # BLD AUTO: 0.1 X10E3/UL (ref 0–0.2)
BASOPHILS NFR BLD AUTO: 1 %
BILIRUB SERPL-MCNC: 0.6 MG/DL (ref 0–1.2)
BUN SERPL-MCNC: 17 MG/DL (ref 6–24)
BUN/CREAT SERPL: 19 (ref 9–23)
CALCIUM SERPL-MCNC: 9.6 MG/DL (ref 8.7–10.2)
CHLORIDE SERPL-SCNC: 101 MMOL/L (ref 96–106)
CHOLEST SERPL-MCNC: 246 MG/DL (ref 100–199)
CO2 SERPL-SCNC: 24 MMOL/L (ref 20–29)
CREAT SERPL-MCNC: 0.89 MG/DL (ref 0.57–1)
CRP SERPL-MCNC: <1 MG/L (ref 0–10)
EGFRCR SERPLBLD CKD-EPI 2021: 76 ML/MIN/1.73
EOSINOPHIL # BLD AUTO: 0.1 X10E3/UL (ref 0–0.4)
EOSINOPHIL NFR BLD AUTO: 2 %
ERYTHROCYTE [DISTWIDTH] IN BLOOD BY AUTOMATED COUNT: 13.8 % (ref 11.7–15.4)
ERYTHROCYTE [SEDIMENTATION RATE] IN BLOOD BY WESTERGREN METHOD: 2 MM/HR (ref 0–40)
FERRITIN SERPL-MCNC: 122 NG/ML (ref 15–150)
FOLATE SERPL-MCNC: 6.5 NG/ML
GLOBULIN SER CALC-MCNC: 2.3 G/DL (ref 1.5–4.5)
GLUCOSE SERPL-MCNC: 87 MG/DL (ref 70–99)
HCT VFR BLD AUTO: 40.7 % (ref 34–46.6)
HDLC SERPL-MCNC: 121 MG/DL
HGB BLD-MCNC: 13 G/DL (ref 11.1–15.9)
IMM GRANULOCYTES # BLD AUTO: 0 X10E3/UL (ref 0–0.1)
IMM GRANULOCYTES NFR BLD AUTO: 0 %
IRON SERPL-MCNC: 105 UG/DL (ref 27–159)
LDLC SERPL CALC-MCNC: 116 MG/DL (ref 0–99)
LYMPHOCYTES # BLD AUTO: 1.8 X10E3/UL (ref 0.7–3.1)
LYMPHOCYTES NFR BLD AUTO: 43 %
MCH RBC QN AUTO: 30.8 PG (ref 26.6–33)
MCHC RBC AUTO-ENTMCNC: 31.9 G/DL (ref 31.5–35.7)
MCV RBC AUTO: 96 FL (ref 79–97)
MONOCYTES # BLD AUTO: 0.3 X10E3/UL (ref 0.1–0.9)
MONOCYTES NFR BLD AUTO: 7 %
NEUTROPHILS # BLD AUTO: 2 X10E3/UL (ref 1.4–7)
NEUTROPHILS NFR BLD AUTO: 47 %
PLATELET # BLD AUTO: 248 X10E3/UL (ref 150–450)
POTASSIUM SERPL-SCNC: 4.6 MMOL/L (ref 3.5–5.2)
PROT SERPL-MCNC: 6.9 G/DL (ref 6–8.5)
RBC # BLD AUTO: 4.22 X10E6/UL (ref 3.77–5.28)
RHEUMATOID FACT SERPL-ACNC: 10.6 IU/ML
SODIUM SERPL-SCNC: 141 MMOL/L (ref 134–144)
TRIGL SERPL-MCNC: 56 MG/DL (ref 0–149)
TSH SERPL DL<=0.005 MIU/L-ACNC: 0.9 UIU/ML (ref 0.45–4.5)
VIT B12 SERPL-MCNC: 304 PG/ML (ref 232–1245)
VLDLC SERPL CALC-MCNC: 9 MG/DL (ref 5–40)
WBC # BLD AUTO: 4.3 X10E3/UL (ref 3.4–10.8)